# Patient Record
Sex: FEMALE | Race: WHITE | Employment: FULL TIME | URBAN - METROPOLITAN AREA
[De-identification: names, ages, dates, MRNs, and addresses within clinical notes are randomized per-mention and may not be internally consistent; named-entity substitution may affect disease eponyms.]

---

## 2020-01-19 ENCOUNTER — HOSPITAL ENCOUNTER (EMERGENCY)
Age: 55
Discharge: HOME | End: 2020-01-19
Payer: COMMERCIAL

## 2020-01-19 ENCOUNTER — APPOINTMENT (OUTPATIENT)
Dept: GENERAL RADIOLOGY | Age: 55
End: 2020-01-19
Payer: COMMERCIAL

## 2020-01-19 VITALS
RESPIRATION RATE: 19 BRPM | BODY MASS INDEX: 31.83 KG/M2 | OXYGEN SATURATION: 99 % | WEIGHT: 173 LBS | HEART RATE: 107 BPM | DIASTOLIC BLOOD PRESSURE: 92 MMHG | SYSTOLIC BLOOD PRESSURE: 150 MMHG | TEMPERATURE: 98.8 F | HEIGHT: 62 IN

## 2020-01-19 LAB
EKG ATRIAL RATE: 109 BPM
EKG P AXIS: 41 DEGREES
EKG P-R INTERVAL: 140 MS
EKG Q-T INTERVAL: 292 MS
EKG QRS DURATION: 76 MS
EKG QTC CALCULATION (BAZETT): 393 MS
EKG R AXIS: 12 DEGREES
EKG T AXIS: 12 DEGREES
EKG VENTRICULAR RATE: 109 BPM

## 2020-01-19 PROCEDURE — 99285 EMERGENCY DEPT VISIT HI MDM: CPT

## 2020-01-19 PROCEDURE — 71101 X-RAY EXAM UNILAT RIBS/CHEST: CPT

## 2020-01-19 PROCEDURE — 6370000000 HC RX 637 (ALT 250 FOR IP): Performed by: PHYSICIAN ASSISTANT

## 2020-01-19 PROCEDURE — 93005 ELECTROCARDIOGRAM TRACING: CPT | Performed by: PHYSICIAN ASSISTANT

## 2020-01-19 RX ORDER — MELOXICAM 15 MG/1
15 TABLET ORAL DAILY
COMMUNITY

## 2020-01-19 RX ORDER — OXYCODONE HYDROCHLORIDE AND ACETAMINOPHEN 5; 325 MG/1; MG/1
1 TABLET ORAL ONCE
Status: COMPLETED | OUTPATIENT
Start: 2020-01-19 | End: 2020-01-19

## 2020-01-19 RX ORDER — OXYCODONE HYDROCHLORIDE AND ACETAMINOPHEN 5; 325 MG/1; MG/1
1 TABLET ORAL EVERY 6 HOURS PRN
Qty: 20 TABLET | Refills: 0 | Status: SHIPPED | OUTPATIENT
Start: 2020-01-19 | End: 2020-01-24

## 2020-01-19 RX ORDER — CYCLOBENZAPRINE HCL 10 MG
10 TABLET ORAL 3 TIMES DAILY PRN
Qty: 21 TABLET | Refills: 0 | Status: SHIPPED | OUTPATIENT
Start: 2020-01-19 | End: 2020-01-26

## 2020-01-19 RX ADMIN — OXYCODONE HYDROCHLORIDE AND ACETAMINOPHEN 1 TABLET: 5; 325 TABLET ORAL at 12:08

## 2020-01-19 ASSESSMENT — ENCOUNTER SYMPTOMS
NAUSEA: 0
STRIDOR: 0
DIARRHEA: 0
ABDOMINAL PAIN: 0
RHINORRHEA: 0
COUGH: 1
CONSTIPATION: 0
COLOR CHANGE: 0
EYE DISCHARGE: 0
WHEEZING: 0
ABDOMINAL DISTENTION: 0
SORE THROAT: 0
VOMITING: 0
SHORTNESS OF BREATH: 1

## 2020-01-19 ASSESSMENT — PAIN SCALES - GENERAL
PAINLEVEL_OUTOF10: 9
PAINLEVEL_OUTOF10: 10

## 2020-01-19 ASSESSMENT — PAIN DESCRIPTION - LOCATION: LOCATION: FLANK;BACK

## 2020-01-19 ASSESSMENT — PAIN DESCRIPTION - ORIENTATION: ORIENTATION: LOWER;MID

## 2020-01-19 ASSESSMENT — PAIN DESCRIPTION - DESCRIPTORS: DESCRIPTORS: ACHING

## 2020-01-19 NOTE — ED PROVIDER NOTES
[address] [facility] ED  eMERGENCY dEPARTMENT eNCOUnter      Pt Name: Nelly Flores  MRN: [id number]  [place] 1965  Date of evaluation: 1/19/2020  Provider: Debbie Aguilar PA-C    CHIEF COMPLAINT       Chief Complaint   Patient presents with    Rib Pain    Shortness of Breath         HISTORY OF PRESENT ILLNESS   (Location/Symptom, Timing/Onset,Context/Setting, Quality, Duration, Modifying Factors, Severity)  Note limiting factors. Nelly Flores is a 53 y.o. female who presents to the emergency department complaint of right posterior chest wall pain, and shortness of breath which patient states been ongoing for the last 4 days. States that she had a cough, some shortness of breath, she states this past Wednesday, 1/15/2020, she leaned over the couch to plug in her phone , she states she felt a pop in her ribs in the posterior aspect of the right chest wall. She said ongoing cough shortness of breath and increased pain with deep inspiration or movement since that time. She saw her regular family physician yesterday, he gave her a prescription for Zithromax, and advised her she most likely had a rib fracture and continue use of Tylenol Motrin as needed for pain control. She states the pain has become unbearable at this time she has increased pain with deep inspiration. She is rating her current pain at this time is a 10 out of 10. HPI    NursingNotes were reviewed. REVIEW OF SYSTEMS    (2-9 systems for level 4, 10 or more for level 5)     Review of Systems   Constitutional: Negative for activity change and appetite change. HENT: Negative for congestion, ear discharge, ear pain, nosebleeds, rhinorrhea and sore throat. Eyes: Negative for discharge. Respiratory: Positive for cough and shortness of breath. Negative for wheezing and stridor. Cardiovascular: Negative for chest pain, palpitations and leg swelling.    Gastrointestinal: Negative for abdominal distention, violence:     Fear of current or ex partner: None     Emotionally abused: None     Physically abused: None     Forced sexual activity: None   Other Topics Concern    None   Social History Narrative    None       SCREENINGS      @[id number])@      PHYSICAL EXAM    (up to 7 for level 4, 8 or more for level 5)     ED Triage Vitals   BP Temp Temp src Pulse Resp SpO2 Height Weight   -- -- -- -- -- -- -- --       Physical Exam  Constitutional:       General: She is in acute distress. Appearance: She is well-developed. She is not ill-appearing, toxic-appearing or diaphoretic. HENT:      Head: Normocephalic. Right Ear: Tympanic membrane normal.      Left Ear: Tympanic membrane normal.      Nose: Nose normal.   Eyes:      Pupils: Pupils are equal, round, and reactive to light. Neck:      Musculoskeletal: Neck supple. Vascular: No JVD. Trachea: No tracheal deviation. Cardiovascular:      Rate and Rhythm: Normal rate. Pulmonary:      Effort: Pulmonary effort is normal. No respiratory distress. Breath sounds: No wheezing or rales. Comments: Lung sounds are clear in all fields, no wheeze rales rhonchi heard, no accessory muscle use, no retractions. Patient does have pain on palpation to right posterior chest wall in the area of the fifth and sixth ribs, there is no crepitus, no deformity, no bruising, no rashes. Chest:      Chest wall: No tenderness. Abdominal:      General: There is no distension. Palpations: There is no mass. Tenderness: There is no tenderness. There is no right CVA tenderness, guarding or rebound. Musculoskeletal:         General: No deformity. Skin:     General: Skin is warm. Neurological:      Mental Status: She is oriented to person, place, and time.       Coordination: Coordination normal.         DIAGNOSTIC RESULTS     EKG: All EKG's are interpreted by the Emergency Department Physician who either signs or Co-signsthis chart in the absence of a cardiologist.    EKG shows sinus tachycardia at 109 bpm T wave inversions in leads III no other ST segment abnormality no ventricular ectopy QT is 292 ms. RADIOLOGY:   Non-plain filmimages such as CT, Ultrasound and MRI are read by the radiologist. Plain radiographic images are visualized and preliminarily interpreted by the emergency physician with the below findings:    CXR shows small left lower lobe pleural effusion versus infiltrate, no acute rib fractures are noted. Interpretation per the Radiologist below, if available at the time ofthis note:    XR Buddy 82 (MIN 3 VIEWS)    (Results Pending)         ED BEDSIDE ULTRASOUND:   Performed by ED Physician - none    LABS:  Labs Reviewed - No data to display    All other labs were within normal range or not returned as of this dictation. EMERGENCY DEPARTMENT COURSE and DIFFERENTIAL DIAGNOSIS/MDM:   Vitals:    Vitals:    01/19/20 1149   BP: (!) 150/92   Pulse: 107   Resp: 19   Temp: 98.8 °F (37.1 °C)   SpO2: 99%   Weight: 173 lb (78.5 kg)   Height: 5' 2\" (1.575 m)          MDM  Number of Diagnoses or Management Options  Pneumonia due to organism:   Rib pain on right side:   Diagnosis management comments: Patient presents emerged part with complaint of right-sided chest wall pain, secondary to leaning over couch, and feeling a pop in her ribs. He states this occurred approximately 3 days ago, she was seen by her family physician since that time. Was given a prescription for antibiotics for concerns of pneumonia, but advised to take Tylenol Motrin for pain control. Chest x-ray on visit today shows no acute signs for rib fracture. There are concerns for early left lower lobe infiltrate. She is advised to continue on the antibiotics as prescribed by her family physician. She was given a prescription for Percocet for pain control as well as Flexeril. And a work note keeping off work for approximately 1 week.   At her request she was

## 2020-01-20 PROCEDURE — 93010 ELECTROCARDIOGRAM REPORT: CPT | Performed by: INTERNAL MEDICINE
